# Patient Record
Sex: MALE | Race: WHITE | Employment: OTHER | URBAN - METROPOLITAN AREA
[De-identification: names, ages, dates, MRNs, and addresses within clinical notes are randomized per-mention and may not be internally consistent; named-entity substitution may affect disease eponyms.]

---

## 2017-02-12 ENCOUNTER — HOSPITAL ENCOUNTER (OUTPATIENT)
Age: 57
Discharge: HOME | End: 2017-02-12
Payer: COMMERCIAL

## 2017-02-12 VITALS
DIASTOLIC BLOOD PRESSURE: 87 MMHG | RESPIRATION RATE: 18 BRPM | TEMPERATURE: 98 F | HEART RATE: 81 BPM | SYSTOLIC BLOOD PRESSURE: 145 MMHG

## 2017-02-12 DIAGNOSIS — K21.9 GASTROESOPHAGEAL REFLUX DISEASE, ESOPHAGITIS PRESENCE NOT SPECIFIED: Primary | ICD-10-CM

## 2017-02-12 PROCEDURE — 99213 OFFICE O/P EST LOW 20 MIN: CPT

## 2017-02-12 PROCEDURE — 99214 OFFICE O/P EST MOD 30 MIN: CPT

## 2017-02-12 RX ORDER — PANTOPRAZOLE SODIUM 20 MG/1
20 TABLET, DELAYED RELEASE ORAL DAILY
Qty: 30 TABLET | Refills: 0 | Status: SHIPPED | OUTPATIENT
Start: 2017-02-12 | End: 2017-03-14

## 2017-02-12 NOTE — ED INITIAL ASSESSMENT (HPI)
C/O burning sensation mid chest area after eating for about a week and also burning sensation to rectal area after each bowel movement. Taking Tums with some relief. Hx of indigestion in the past. Denies chest pain.

## 2017-02-12 NOTE — ED PROVIDER NOTES
Patient Seen in: [facility] In [address] [place] [address] [address]    History   No chief complaint on file.     Stated Complaint: INDIGESTION X 1WK    HPI    CHIEF COMPLAINT: Indigestion    HISTORY OF PRESENT ILLNESS: Patient is a 70-year-old male [facility] in today's nurse's notes are reviewed and agree. The patient's family history is reviewed and is noncontributory to the presenting problem, except as indicated as above.     Past Medical History   Diagnosis Date   • Unspecified essential hypertension changes, patient handling secretions well.   Uvula midline  Respiratory: there are no retractions, lungs are clear to auscultation  Cardiovascular: regular rate and rhythm  Gastrointestinal:  abdomen is soft and non tender, no masses, bowel sounds normal. N Refills: 0

## 2018-01-13 ENCOUNTER — APPOINTMENT (OUTPATIENT)
Dept: GENERAL RADIOLOGY | Age: 58
End: 2018-01-13
Attending: FAMILY MEDICINE
Payer: COMMERCIAL

## 2018-01-13 ENCOUNTER — HOSPITAL ENCOUNTER (OUTPATIENT)
Age: 58
Discharge: HOME | End: 2018-01-13
Attending: FAMILY MEDICINE
Payer: COMMERCIAL

## 2018-01-13 VITALS
RESPIRATION RATE: 18 BRPM | TEMPERATURE: 98 F | WEIGHT: 315 LBS | SYSTOLIC BLOOD PRESSURE: 140 MMHG | HEART RATE: 86 BPM | DIASTOLIC BLOOD PRESSURE: 90 MMHG | BODY MASS INDEX: 43 KG/M2 | OXYGEN SATURATION: 96 %

## 2018-01-13 DIAGNOSIS — S20.212A CONTUSION OF RIB ON LEFT SIDE, INITIAL ENCOUNTER: Primary | ICD-10-CM

## 2018-01-13 DIAGNOSIS — S93.401A MILD SPRAIN OF RIGHT ANKLE, INITIAL ENCOUNTER: ICD-10-CM

## 2018-01-13 DIAGNOSIS — S70.02XA CONTUSION OF LEFT HIP, INITIAL ENCOUNTER: ICD-10-CM

## 2018-01-13 PROCEDURE — 73610 X-RAY EXAM OF ANKLE: CPT | Performed by: FAMILY MEDICINE

## 2018-01-13 PROCEDURE — 99214 OFFICE O/P EST MOD 30 MIN: CPT

## 2018-01-13 PROCEDURE — 73502 X-RAY EXAM HIP UNI 2-3 VIEWS: CPT | Performed by: FAMILY MEDICINE

## 2018-01-13 PROCEDURE — 71101 X-RAY EXAM UNILAT RIBS/CHEST: CPT | Performed by: FAMILY MEDICINE

## 2018-01-13 RX ORDER — NICOTINE POLACRILEX 4 MG/1
GUM, CHEWING ORAL
COMMUNITY
End: 2019-10-23

## 2018-01-13 NOTE — ED INITIAL ASSESSMENT (HPI)
Pt. States he tripped/slipped in his garage. Rt. Ankle/foot got caught under him. Left hip & Lt. Ribs (fell more to the left). Denies head injury.

## 2018-01-15 NOTE — ED PROVIDER NOTES
Patient Seen in: [facility]    History   Patient presents with: Ankle Injury: Rt. Hip Pain: Lt.   Trauma (cardiovascular, musculoskeletal): Lt.    Stated Complaint: rib pain / left hip / right ankle pain    HPI  61 yo M here wi conjunctiva   HEAD: Normocephalic, atraumatic  EENT: OP - wnl, moist, Nares normal  NECK: FROM, supple  CHEST: Symmetrical, diffuse tenderness noted over the lateral and posterior rib cage but no bruises noted at this time.    LUNGS: CTAB, no RRW  CV: RRR Ribs (fell more to the left). Denies head               injury.                         Order Specific Question: What is the Relevant Clinical Indication / Reason for Exam?          Answer: rib pain / left hip / right ankle pain      Apply ace wrap Once Approved by: Hiral Edwards MD            Xr Hip W Or Wo Pelvis 2 Or 3 Views, Left (cpt=73502)    Result Date: 1/13/2018  PROCEDURE:  XR HIP W OR WO PELVIS 2 OR 3 VIEWS, LEFT (CPT=73502)  TECHNIQUE:  Unilateral 2 to 3 views of the hip and pelvis if per encounter    Disposition:  Discharge  1/13/2018  4:00 pm    Follow-up:        PMD in the next 3-5 days for reassessment of symptoms         Medications Prescribed:  Discharge Medication List as of 1/13/2018  4:33 PM

## 2018-01-31 ENCOUNTER — HOSPITAL ENCOUNTER (OUTPATIENT)
Age: 58
Discharge: HOME | End: 2018-01-31
Attending: PHYSICIAN ASSISTANT
Payer: COMMERCIAL

## 2018-01-31 ENCOUNTER — APPOINTMENT (OUTPATIENT)
Dept: GENERAL RADIOLOGY | Age: 58
End: 2018-01-31
Attending: PHYSICIAN ASSISTANT
Payer: COMMERCIAL

## 2018-01-31 VITALS
SYSTOLIC BLOOD PRESSURE: 138 MMHG | HEART RATE: 99 BPM | DIASTOLIC BLOOD PRESSURE: 87 MMHG | RESPIRATION RATE: 18 BRPM | TEMPERATURE: 98 F | OXYGEN SATURATION: 98 %

## 2018-01-31 DIAGNOSIS — S22.49XD CLOSED FRACTURE OF MULTIPLE RIBS WITH ROUTINE HEALING, UNSPECIFIED LATERALITY, SUBSEQUENT ENCOUNTER: Primary | ICD-10-CM

## 2018-01-31 DIAGNOSIS — J98.01 COUGH DUE TO BRONCHOSPASM: ICD-10-CM

## 2018-01-31 PROCEDURE — 99214 OFFICE O/P EST MOD 30 MIN: CPT

## 2018-01-31 PROCEDURE — 71111 X-RAY EXAM RIBS/CHEST4/> VWS: CPT | Performed by: PHYSICIAN ASSISTANT

## 2018-01-31 PROCEDURE — 99213 OFFICE O/P EST LOW 20 MIN: CPT

## 2018-01-31 RX ORDER — PROMETHAZINE HYDROCHLORIDE AND CODEINE PHOSPHATE 6.25; 1 MG/5ML; MG/5ML
5 SYRUP ORAL EVERY 4 HOURS PRN
Qty: 180 ML | Refills: 0 | Status: SHIPPED | OUTPATIENT
Start: 2018-01-31 | End: 2018-03-02

## 2018-03-07 ENCOUNTER — OCC HEALTH (OUTPATIENT)
Dept: OCCUPATIONAL MEDICINE | Age: 58
End: 2018-03-07
Attending: PHYSICIAN ASSISTANT

## 2019-07-26 ENCOUNTER — HOSPITAL ENCOUNTER (OUTPATIENT)
Age: 59
Discharge: HOME | End: 2019-07-26
Attending: EMERGENCY MEDICINE
Payer: COMMERCIAL

## 2019-07-26 VITALS
BODY MASS INDEX: 39.17 KG/M2 | HEIGHT: 75 IN | SYSTOLIC BLOOD PRESSURE: 157 MMHG | OXYGEN SATURATION: 95 % | HEART RATE: 84 BPM | TEMPERATURE: 99 F | RESPIRATION RATE: 18 BRPM | DIASTOLIC BLOOD PRESSURE: 94 MMHG | WEIGHT: 315 LBS

## 2019-07-26 DIAGNOSIS — M54.31 SCIATICA OF RIGHT SIDE: Primary | ICD-10-CM

## 2019-07-26 PROCEDURE — 99213 OFFICE O/P EST LOW 20 MIN: CPT

## 2019-07-26 PROCEDURE — 99214 OFFICE O/P EST MOD 30 MIN: CPT

## 2019-07-26 RX ORDER — METHYLPREDNISOLONE 4 MG/1
TABLET ORAL
Qty: 1 PACKAGE | Refills: 0 | Status: SHIPPED | OUTPATIENT
Start: 2019-07-26 | End: 2019-10-23 | Stop reason: ALTCHOICE

## 2019-07-26 RX ORDER — CYCLOBENZAPRINE HCL 10 MG
10 TABLET ORAL 3 TIMES DAILY PRN
Qty: 20 TABLET | Refills: 0 | Status: SHIPPED | OUTPATIENT
Start: 2019-07-26 | End: 2019-08-02

## 2019-07-26 RX ORDER — HYDROCODONE BITARTRATE AND ACETAMINOPHEN 5; 325 MG/1; MG/1
1-2 TABLET ORAL EVERY 4 HOURS PRN
Qty: 20 TABLET | Refills: 0 | Status: SHIPPED | OUTPATIENT
Start: 2019-07-26 | End: 2019-08-02

## 2019-07-26 NOTE — ED INITIAL ASSESSMENT (HPI)
Patient presents with right lower back pain x 6 days which has worsened in the past few days radiates to numbness to right leg. Worse picking up grandson. No incontinence or bowel or bladder.

## 2019-07-26 NOTE — ED PROVIDER NOTES
Patient Seen in: [facility]    History   Patient presents with:  Back Pain (musculoskeletal)  Numbness    Stated Complaint: r lower back pain, r leg numb    HPI    60-year-old male presents emergency department complaining of ri lymphadenopathy no meningismus no carotid bruit  CV: Regular rate and rhythm no murmur rub  Respiratory: Clear to auscultation bilaterally no crackles no wheezes no accessory muscle use  Abdomen: Soft nontender nondistended, no rebound no guarding  no hepa

## 2019-08-08 ENCOUNTER — HOSPITAL ENCOUNTER (OUTPATIENT)
Age: 59
Discharge: HOME | End: 2019-08-08
Attending: FAMILY MEDICINE
Payer: COMMERCIAL

## 2019-08-08 VITALS
OXYGEN SATURATION: 97 % | DIASTOLIC BLOOD PRESSURE: 93 MMHG | SYSTOLIC BLOOD PRESSURE: 145 MMHG | TEMPERATURE: 99 F | RESPIRATION RATE: 18 BRPM | HEART RATE: 89 BPM

## 2019-08-08 DIAGNOSIS — K64.1 GRADE II HEMORRHOIDS: Primary | ICD-10-CM

## 2019-08-08 PROCEDURE — 99212 OFFICE O/P EST SF 10 MIN: CPT

## 2019-08-08 PROCEDURE — 99213 OFFICE O/P EST LOW 20 MIN: CPT

## 2019-08-08 RX ORDER — DOCUSATE SODIUM 100 MG/1
100 CAPSULE, LIQUID FILLED ORAL 2 TIMES DAILY
Qty: 20 CAPSULE | Refills: 0 | Status: SHIPPED | OUTPATIENT
Start: 2019-08-08 | End: 2019-08-18

## 2019-08-08 NOTE — ED PROVIDER NOTES
Patient Seen in: [facility]    History   Patient presents with:  Anal Problem (GI)    Stated Complaint: rash s/p hospital stay    HPI  63-year-old gentleman presents to Medicare with a 4-day history of rectal pain, patient was r exudate. Eyes: Pupils are equal, round, and reactive to light. Conjunctivae and EOM are normal. Right eye exhibits no discharge. Left eye exhibits no discharge. Neck: Normal range of motion. Neck supple. No thyromegaly present.    Cardiovascular: Normal

## 2019-08-08 NOTE — ED INITIAL ASSESSMENT (HPI)
Patient presents with cc of rectal pain for 4 or more days since being in the hospital for intractable back pain. No blood noted. Most painful after a BM.

## 2019-10-23 ENCOUNTER — OFFICE VISIT (OUTPATIENT)
Dept: FAMILY MEDICINE CLINIC | Facility: CLINIC | Age: 59
End: 2019-10-23
Payer: COMMERCIAL

## 2019-10-23 VITALS
RESPIRATION RATE: 16 BRPM | WEIGHT: 315 LBS | HEIGHT: 75 IN | DIASTOLIC BLOOD PRESSURE: 74 MMHG | TEMPERATURE: 99 F | BODY MASS INDEX: 39.17 KG/M2 | HEART RATE: 96 BPM | SYSTOLIC BLOOD PRESSURE: 132 MMHG | OXYGEN SATURATION: 97 %

## 2019-10-23 DIAGNOSIS — R05.9 COUGH: Primary | ICD-10-CM

## 2019-10-23 PROCEDURE — 99202 OFFICE O/P NEW SF 15 MIN: CPT | Performed by: NURSE PRACTITIONER

## 2019-10-23 RX ORDER — CELECOXIB 200 MG/1
CAPSULE ORAL
Refills: 0 | COMMUNITY
Start: 2019-10-09 | End: 2021-03-22 | Stop reason: ALTCHOICE

## 2019-10-23 RX ORDER — CYCLOBENZAPRINE HCL 10 MG
TABLET ORAL
Refills: 0 | COMMUNITY
Start: 2019-10-08 | End: 2021-03-22 | Stop reason: ALTCHOICE

## 2019-10-23 RX ORDER — BENZONATATE 200 MG/1
200 CAPSULE ORAL 3 TIMES DAILY PRN
Qty: 20 CAPSULE | Refills: 0 | Status: SHIPPED | OUTPATIENT
Start: 2019-10-23 | End: 2019-10-30

## 2019-10-23 RX ORDER — HYDROCODONE BITARTRATE AND ACETAMINOPHEN 5; 325 MG/1; MG/1
TABLET ORAL
Refills: 0 | COMMUNITY
Start: 2019-08-03 | End: 2021-03-22 | Stop reason: ALTCHOICE

## 2019-10-23 RX ORDER — OMEPRAZOLE 20 MG/1
CAPSULE, DELAYED RELEASE ORAL
Refills: 9 | COMMUNITY
Start: 2019-10-01

## 2019-10-23 RX ORDER — GABAPENTIN 600 MG/1
TABLET ORAL
Refills: 2 | COMMUNITY
Start: 2019-10-09 | End: 2021-03-22 | Stop reason: ALTCHOICE

## 2019-10-23 RX ORDER — LISINOPRIL 20 MG/1
TABLET ORAL
Refills: 3 | COMMUNITY
Start: 2019-09-24 | End: 2021-03-22 | Stop reason: ALTCHOICE

## 2019-10-23 NOTE — PATIENT INSTRUCTIONS
Start mucinex  Drink plenty of fluids. Cool mist humidifier. Benzonatate for cough as instructed. Viral Upper Respiratory Illness (Adult)    You have a viral upper respiratory illness (URI), which is another term for the common cold.  This illness your healthcare provider or pharmacist which over-the-counter medicines are safe to use. (Note: Don't use decongestants if you have high blood pressure.)  Follow-up care  Follow up with your healthcare provider, or as advised.   When to seek medical advice

## 2019-10-23 NOTE — PROGRESS NOTES
CHIEF COMPLAINT:   Patient presents with:  Cough: bad cough, xstarted last night        HPI:   Fina Shanae is a 61year old male who presents for cough since last night. Cough is occas prod; worse last night and interfered with sleep.   Associated sympt HENT: Atraumatic, normocephalic. TM's clear bilaterally. Nostrils patent, nasal mucosa mildly inflamed, clear nasal mucus. No erythema of the throat. NECK: supple, non-tender. LUNGS: Normal respiratory rate. Normal effort.   Dry cough once during exam. You have a viral upper respiratory illness (URI), which is another term for the common cold. This illness is contagious during the first few days. It is spread through the air by coughing and sneezing.  It may also be spread by direct contact (touching the · Over-the-counter cold medicines will not shorten the length of time you’re sick, but they may be helpful for the following symptoms: cough, sore throat, and nasal and sinus congestion.  If you take prescription medicines, ask your healthcare provider or p

## 2021-03-22 ENCOUNTER — HOSPITAL ENCOUNTER (OUTPATIENT)
Age: 61
Discharge: HOME | End: 2021-03-22
Payer: COMMERCIAL

## 2021-03-22 VITALS
HEIGHT: 75 IN | DIASTOLIC BLOOD PRESSURE: 88 MMHG | HEART RATE: 66 BPM | OXYGEN SATURATION: 98 % | SYSTOLIC BLOOD PRESSURE: 143 MMHG | RESPIRATION RATE: 16 BRPM | BODY MASS INDEX: 38.3 KG/M2 | TEMPERATURE: 98 F | WEIGHT: 308 LBS

## 2021-03-22 DIAGNOSIS — J01.00 ACUTE NON-RECURRENT MAXILLARY SINUSITIS: Primary | ICD-10-CM

## 2021-03-22 DIAGNOSIS — J98.01 BRONCHOSPASM: ICD-10-CM

## 2021-03-22 LAB — SARS-COV-2 RNA RESP QL NAA+PROBE: NOT DETECTED

## 2021-03-22 PROCEDURE — 99213 OFFICE O/P EST LOW 20 MIN: CPT | Performed by: PHYSICIAN ASSISTANT

## 2021-03-22 RX ORDER — FLUTICASONE PROPIONATE 50 MCG
2 SPRAY, SUSPENSION (ML) NASAL DAILY
Qty: 16 G | Refills: 0 | Status: SHIPPED | OUTPATIENT
Start: 2021-03-22 | End: 2021-04-21

## 2021-03-22 RX ORDER — AZITHROMYCIN 250 MG/1
TABLET, FILM COATED ORAL
Qty: 1 PACKAGE | Refills: 0 | Status: SHIPPED | OUTPATIENT
Start: 2021-03-22 | End: 2021-03-27

## 2021-03-22 NOTE — ED INITIAL ASSESSMENT (HPI)
Pt presents today with c/o nasal congestion x 1 week and a productive cough with white sputum that started last night. Pt denies any fevers.

## 2021-03-22 NOTE — ED PROVIDER NOTES
Patient Seen in: [facility]      History   Patient presents with:  Cough/URI  Nasal Congestion    Stated Complaint: Congestion and cough 1 wk    HPI/Subjective:   HPI    Pleasant 51-year-old gentleman.   Medical history of reflux and hyper normal conjunctival  ENT: No injection noted to the bilateral auditory canals; no loss of landmarks. Normal nasal mucosa without audible nasal congestion. Oropharynx is patent without evidence of erythema, exudates or deviation.   No stridor to auscultatio

## 2021-07-12 ENCOUNTER — APPOINTMENT (OUTPATIENT)
Dept: GENERAL RADIOLOGY | Age: 61
End: 2021-07-12
Attending: NURSE PRACTITIONER
Payer: COMMERCIAL

## 2021-07-12 ENCOUNTER — HOSPITAL ENCOUNTER (OUTPATIENT)
Age: 61
Discharge: HOME | End: 2021-07-12
Attending: EMERGENCY MEDICINE
Payer: COMMERCIAL

## 2021-07-12 VITALS
WEIGHT: 315 LBS | TEMPERATURE: 98 F | HEIGHT: 75 IN | SYSTOLIC BLOOD PRESSURE: 147 MMHG | OXYGEN SATURATION: 95 % | HEART RATE: 75 BPM | DIASTOLIC BLOOD PRESSURE: 82 MMHG | RESPIRATION RATE: 18 BRPM | BODY MASS INDEX: 39.17 KG/M2

## 2021-07-12 DIAGNOSIS — R10.9 ACUTE RIGHT FLANK PAIN: Primary | ICD-10-CM

## 2021-07-12 DIAGNOSIS — S63.602A SPRAIN OF LEFT THUMB, UNSPECIFIED SITE OF DIGIT, INITIAL ENCOUNTER: ICD-10-CM

## 2021-07-12 LAB
POCT BILIRUBIN URINE: NEGATIVE
POCT BLOOD URINE: NEGATIVE
POCT GLUCOSE URINE: NEGATIVE MG/DL
POCT KETONE URINE: NEGATIVE MG/DL
POCT LEUKOCYTE ESTERASE URINE: NEGATIVE
POCT NITRITE URINE: NEGATIVE
POCT PH URINE: 6 (ref 5–8)
POCT PROTEIN URINE: NEGATIVE MG/DL
POCT SPECIFIC GRAVITY URINE: 1.02
POCT URINE CLARITY: CLEAR
POCT URINE COLOR: YELLOW
POCT UROBILINOGEN URINE: 0.2 MG/DL

## 2021-07-12 PROCEDURE — 73140 X-RAY EXAM OF FINGER(S): CPT | Performed by: NURSE PRACTITIONER

## 2021-07-12 PROCEDURE — 99214 OFFICE O/P EST MOD 30 MIN: CPT

## 2021-07-12 PROCEDURE — 96372 THER/PROPH/DIAG INJ SC/IM: CPT

## 2021-07-12 PROCEDURE — 99213 OFFICE O/P EST LOW 20 MIN: CPT

## 2021-07-12 PROCEDURE — 81002 URINALYSIS NONAUTO W/O SCOPE: CPT | Performed by: NURSE PRACTITIONER

## 2021-07-12 RX ORDER — KETOROLAC TROMETHAMINE 30 MG/ML
30 INJECTION, SOLUTION INTRAMUSCULAR; INTRAVENOUS ONCE
Status: COMPLETED | OUTPATIENT
Start: 2021-07-12 | End: 2021-07-12

## 2021-07-12 NOTE — ED PROVIDER NOTES
Patient Seen in: [facility]      History   Patient presents with:  Back Pain  Finger Injury    Stated Complaint: FLANK PAIN / BACK PAIN    HPI/Subjective:   HPI  Patient is a 10-year-old male with  past medical history of esophageal reflu report initial swelling which has resolved somewhat.         Objective:   Past Medical History:   Diagnosis Date   • Esophageal reflux               Past Surgical History:   Procedure Laterality Date   • OTHER SURGICAL HISTORY      finger x 2   • OTHER SURG Musculoskeletal:      Comments: Left hand Exam:  No swelling, ecchymosis, erythema noted. Radial, median, ulnar nerves grossly intact. Sensation intact    Tendons:  Extensor and deep and superficial flexor tendons intact.    Mild tenderness with palpatio osseous lesions noted. Osteoarthritic changes. There are well-corticated calcific densities adjacent to the distal interphalangeal joint which may represent accessory ossifications or old injuries.   I personally reviewed this x-ray and agree with finding

## 2021-07-12 NOTE — ED INITIAL ASSESSMENT (HPI)
Pt presents to the IC with complaints of pain to right back. Pt states he woke today with a  Dull pain and then sneezed which made the pain significantly worse.  Pt denies any trauma but states he is always lifting, but reports he had been out of town [address]

## 2021-07-12 NOTE — ED PROVIDER NOTES
I reviewed that chart and discussed the case. I have examined the patient and noted patient is awake alert no acute distress. No abdominal tenderness. No midline lumbar tenderness.   There is focal tenderness to the right lower paraspinal/flank musculatu

## 2022-07-21 ENCOUNTER — HOSPITAL ENCOUNTER (OUTPATIENT)
Age: 62
Discharge: HOME | End: 2022-07-21
Payer: COMMERCIAL

## 2022-07-21 VITALS
TEMPERATURE: 98 F | OXYGEN SATURATION: 97 % | SYSTOLIC BLOOD PRESSURE: 135 MMHG | DIASTOLIC BLOOD PRESSURE: 80 MMHG | HEART RATE: 69 BPM | HEIGHT: 75 IN | RESPIRATION RATE: 17 BRPM | BODY MASS INDEX: 29.84 KG/M2 | WEIGHT: 240 LBS

## 2022-07-21 DIAGNOSIS — J06.9 UPPER RESPIRATORY TRACT INFECTION, UNSPECIFIED TYPE: Primary | ICD-10-CM

## 2022-07-21 LAB — SARS-COV-2 RNA RESP QL NAA+PROBE: NOT DETECTED

## 2022-07-21 PROCEDURE — 99213 OFFICE O/P EST LOW 20 MIN: CPT

## 2022-07-21 RX ORDER — BENZONATATE 100 MG/1
100 CAPSULE ORAL 3 TIMES DAILY PRN
Qty: 30 CAPSULE | Refills: 0 | Status: SHIPPED | OUTPATIENT
Start: 2022-07-21 | End: 2022-08-20

## 2022-07-21 RX ORDER — GUAIFENESIN AND CODEINE PHOSPHATE 100; 10 MG/5ML; MG/5ML
5 SOLUTION ORAL EVERY 8 HOURS PRN
Qty: 118 ML | Refills: 0 | Status: SHIPPED | OUTPATIENT
Start: 2022-07-21 | End: 2022-08-04

## 2023-05-08 ENCOUNTER — APPOINTMENT (OUTPATIENT)
Dept: ULTRASOUND IMAGING | Age: 63
End: 2023-05-08
Payer: COMMERCIAL

## 2023-05-08 ENCOUNTER — HOSPITAL ENCOUNTER (EMERGENCY)
Age: 63
Discharge: HOME | End: 2023-05-08
Attending: EMERGENCY MEDICINE
Payer: COMMERCIAL

## 2023-05-08 ENCOUNTER — APPOINTMENT (OUTPATIENT)
Dept: GENERAL RADIOLOGY | Age: 63
End: 2023-05-08
Payer: COMMERCIAL

## 2023-05-08 VITALS
DIASTOLIC BLOOD PRESSURE: 85 MMHG | OXYGEN SATURATION: 97 % | TEMPERATURE: 98 F | RESPIRATION RATE: 16 BRPM | BODY MASS INDEX: 34.19 KG/M2 | SYSTOLIC BLOOD PRESSURE: 161 MMHG | HEIGHT: 75 IN | HEART RATE: 69 BPM | WEIGHT: 275 LBS

## 2023-05-08 DIAGNOSIS — I82.502 CHRONIC DEEP VEIN THROMBOSIS (DVT) OF LEFT LOWER EXTREMITY, UNSPECIFIED VEIN (HCC): Primary | ICD-10-CM

## 2023-05-08 DIAGNOSIS — S83.412A SPRAIN OF MEDIAL COLLATERAL LIGAMENT OF LEFT KNEE, INITIAL ENCOUNTER: ICD-10-CM

## 2023-05-08 PROCEDURE — 99284 EMERGENCY DEPT VISIT MOD MDM: CPT

## 2023-05-08 PROCEDURE — 93971 EXTREMITY STUDY: CPT

## 2023-05-08 PROCEDURE — 73560 X-RAY EXAM OF KNEE 1 OR 2: CPT

## 2023-05-08 RX ORDER — NAPROXEN 500 MG/1
500 TABLET ORAL 2 TIMES DAILY PRN
Qty: 20 TABLET | Refills: 0 | Status: SHIPPED | OUTPATIENT
Start: 2023-05-08 | End: 2023-05-18

## 2023-05-09 NOTE — ED PROVIDER NOTES
The patient was seen and examined. Note reviewed and agreed. I provided a substantive portion of the care of this patient. I personally performed the Medical Decision Making for this encounter. I evaluated the patient. While the patient has evidence of clot on ultrasound, this does not appear to be acute and in fact the description matches prior ultrasound report from 3 years ago and the patient did have a DVT. His pain today began after an acute injury and is most consistent with knee strain. Recommend treatment with NSAIDs. PCP follow-up. Return for new or worsening symptoms.

## 2023-05-09 NOTE — DISCHARGE INSTRUCTIONS
Follow-up with your PCP, if you do have worsening swelling of the leg or pain or shortness of breath be reevaluated,    Take anti-inflammatory medication with food      The best treatment for minor injuries is R.I.C.E. and NSAID medications. R.I.C.E. = Rest  Ice  Compression  Elevation      Rest: Do not use the injured body part unnecessarily. If this is a lower extremity, do not take long walks, run or do anything that causes increased pain. Gradually progress to your normal activity, using pain as your guide. Ice: Apply cold compresses to the injured area. The area that is injured is inflammed. Inflammation causes warmth, so ice may give some relief. You may ice through a brace or ace wrap. Compression: Compression to the area will help control swelling. An ace wrap is the most simple form of compression. You can also wear a brace. Elevation: Raise the injured extremity above heart level. This will reduce throbbing pain and swelling associated with injures. Prop the injured extremity up with pillows while lying down.

## 2023-06-07 ENCOUNTER — APPOINTMENT (OUTPATIENT)
Dept: CT IMAGING | Age: 63
End: 2023-06-07
Attending: EMERGENCY MEDICINE
Payer: COMMERCIAL

## 2023-06-07 ENCOUNTER — HOSPITAL ENCOUNTER (EMERGENCY)
Age: 63
Discharge: HOME | End: 2023-06-07
Attending: EMERGENCY MEDICINE
Payer: COMMERCIAL

## 2023-06-07 VITALS
BODY MASS INDEX: 34.67 KG/M2 | TEMPERATURE: 99 F | WEIGHT: 273 LBS | RESPIRATION RATE: 20 BRPM | DIASTOLIC BLOOD PRESSURE: 84 MMHG | OXYGEN SATURATION: 98 % | HEART RATE: 80 BPM | HEIGHT: 74.5 IN | SYSTOLIC BLOOD PRESSURE: 132 MMHG

## 2023-06-07 DIAGNOSIS — R10.13 ABDOMINAL PAIN, EPIGASTRIC: Primary | ICD-10-CM

## 2023-06-07 LAB
ALBUMIN SERPL-MCNC: 3.6 G/DL (ref 3.4–5)
ALBUMIN/GLOB SERPL: 1.1 {RATIO} (ref 1–2)
ALP LIVER SERPL-CCNC: 103 U/L
ALT SERPL-CCNC: 24 U/L
ANION GAP SERPL CALC-SCNC: 5 MMOL/L (ref 0–18)
AST SERPL-CCNC: 17 U/L (ref 15–37)
BASOPHILS # BLD AUTO: 0.05 X10(3) UL (ref 0–0.2)
BASOPHILS NFR BLD AUTO: 0.6 %
BILIRUB SERPL-MCNC: 0.3 MG/DL (ref 0.1–2)
BILIRUB UR QL STRIP.AUTO: NEGATIVE
BUN BLD-MCNC: 21 MG/DL (ref 7–18)
CALCIUM BLD-MCNC: 8.7 MG/DL (ref 8.5–10.1)
CHLORIDE SERPL-SCNC: 110 MMOL/L (ref 98–112)
CLARITY UR REFRACT.AUTO: CLEAR
CO2 SERPL-SCNC: 24 MMOL/L (ref 21–32)
COLOR UR AUTO: YELLOW
CREAT BLD-MCNC: 1.02 MG/DL
EOSINOPHIL # BLD AUTO: 0.13 X10(3) UL (ref 0–0.7)
EOSINOPHIL NFR BLD AUTO: 1.6 %
ERYTHROCYTE [DISTWIDTH] IN BLOOD BY AUTOMATED COUNT: 13 %
GFR SERPLBLD BASED ON 1.73 SQ M-ARVRAT: 83 ML/MIN/1.73M2 (ref 60–?)
GLOBULIN PLAS-MCNC: 3.3 G/DL (ref 2.8–4.4)
GLUCOSE BLD-MCNC: 107 MG/DL (ref 70–99)
GLUCOSE UR STRIP.AUTO-MCNC: NEGATIVE MG/DL
HCT VFR BLD AUTO: 41.7 %
HGB BLD-MCNC: 14.4 G/DL
IMM GRANULOCYTES # BLD AUTO: 0.02 X10(3) UL (ref 0–1)
IMM GRANULOCYTES NFR BLD: 0.2 %
KETONES UR STRIP.AUTO-MCNC: NEGATIVE MG/DL
LEUKOCYTE ESTERASE UR QL STRIP.AUTO: NEGATIVE
LIPASE SERPL-CCNC: 85 U/L (ref 13–75)
LYMPHOCYTES # BLD AUTO: 2.67 X10(3) UL (ref 1–4)
LYMPHOCYTES NFR BLD AUTO: 32 %
MCH RBC QN AUTO: 30.8 PG (ref 26–34)
MCHC RBC AUTO-ENTMCNC: 34.5 G/DL (ref 31–37)
MCV RBC AUTO: 89.1 FL
MONOCYTES # BLD AUTO: 0.66 X10(3) UL (ref 0.1–1)
MONOCYTES NFR BLD AUTO: 7.9 %
NEUTROPHILS # BLD AUTO: 4.82 X10 (3) UL (ref 1.5–7.7)
NEUTROPHILS # BLD AUTO: 4.82 X10(3) UL (ref 1.5–7.7)
NEUTROPHILS NFR BLD AUTO: 57.7 %
NITRITE UR QL STRIP.AUTO: NEGATIVE
OSMOLALITY SERPL CALC.SUM OF ELEC: 291 MOSM/KG (ref 275–295)
PH UR STRIP.AUTO: 5 [PH] (ref 5–8)
PLATELET # BLD AUTO: 205 10(3)UL (ref 150–450)
POTASSIUM SERPL-SCNC: 3.6 MMOL/L (ref 3.5–5.1)
PROT SERPL-MCNC: 6.9 G/DL (ref 6.4–8.2)
PROT UR STRIP.AUTO-MCNC: NEGATIVE MG/DL
RBC # BLD AUTO: 4.68 X10(6)UL
RBC UR QL AUTO: NEGATIVE
SODIUM SERPL-SCNC: 139 MMOL/L (ref 136–145)
SP GR UR STRIP.AUTO: 1.02 (ref 1–1.03)
UROBILINOGEN UR STRIP.AUTO-MCNC: 0.2 MG/DL
WBC # BLD AUTO: 8.4 X10(3) UL (ref 4–11)

## 2023-06-07 PROCEDURE — 80053 COMPREHEN METABOLIC PANEL: CPT | Performed by: EMERGENCY MEDICINE

## 2023-06-07 PROCEDURE — 99284 EMERGENCY DEPT VISIT MOD MDM: CPT

## 2023-06-07 PROCEDURE — 96361 HYDRATE IV INFUSION ADD-ON: CPT

## 2023-06-07 PROCEDURE — 83690 ASSAY OF LIPASE: CPT | Performed by: EMERGENCY MEDICINE

## 2023-06-07 PROCEDURE — 85025 COMPLETE CBC W/AUTO DIFF WBC: CPT | Performed by: EMERGENCY MEDICINE

## 2023-06-07 PROCEDURE — 74177 CT ABD & PELVIS W/CONTRAST: CPT | Performed by: EMERGENCY MEDICINE

## 2023-06-07 PROCEDURE — 96374 THER/PROPH/DIAG INJ IV PUSH: CPT

## 2023-06-07 PROCEDURE — 81003 URINALYSIS AUTO W/O SCOPE: CPT | Performed by: EMERGENCY MEDICINE

## 2023-06-07 RX ORDER — KETOROLAC TROMETHAMINE 15 MG/ML
15 INJECTION, SOLUTION INTRAMUSCULAR; INTRAVENOUS ONCE
Status: COMPLETED | OUTPATIENT
Start: 2023-06-07 | End: 2023-06-07

## 2023-06-08 NOTE — ED INITIAL ASSESSMENT (HPI)
Patient with mid abdominal pain since 1100, denies any N/V/D. No pain medication taken PTA. Patient states bilateral flank pain since 1600. Denies any hx of kidney stones.

## 2024-01-31 ENCOUNTER — V-VISIT (OUTPATIENT)
Dept: URGENT CARE | Age: 64
End: 2024-01-31

## 2024-01-31 ENCOUNTER — APPOINTMENT (OUTPATIENT)
Dept: URGENT CARE | Age: 64
End: 2024-01-31

## 2024-01-31 VITALS
TEMPERATURE: 98.4 F | DIASTOLIC BLOOD PRESSURE: 85 MMHG | OXYGEN SATURATION: 97 % | RESPIRATION RATE: 18 BRPM | HEIGHT: 75 IN | WEIGHT: 280 LBS | HEART RATE: 98 BPM | SYSTOLIC BLOOD PRESSURE: 145 MMHG | BODY MASS INDEX: 34.82 KG/M2

## 2024-01-31 DIAGNOSIS — J02.9 SORE THROAT: Primary | ICD-10-CM

## 2024-01-31 DIAGNOSIS — H61.21 IMPACTED CERUMEN OF RIGHT EAR: ICD-10-CM

## 2024-01-31 DIAGNOSIS — R52 BODY ACHES: ICD-10-CM

## 2024-01-31 DIAGNOSIS — J06.9 VIRAL UPPER RESPIRATORY TRACT INFECTION: ICD-10-CM

## 2024-01-31 LAB
FLUAV AG UPPER RESP QL IA.RAPID: NEGATIVE
FLUBV AG UPPER RESP QL IA.RAPID: NEGATIVE
INTERNAL PROCEDURAL CONTROLS ACCEPTABLE: YES
S PYO AG THROAT QL IA.RAPID: NEGATIVE
SARS-COV+SARS-COV-2 AG RESP QL IA.RAPID: NOT DETECTED
TEST LOT EXPIRATION DATE: NORMAL
TEST LOT EXPIRATION DATE: NORMAL
TEST LOT NUMBER: NORMAL
TEST LOT NUMBER: NORMAL

## 2024-01-31 ASSESSMENT — ENCOUNTER SYMPTOMS
NEUROLOGICAL NEGATIVE: 1
HEMATOLOGIC/LYMPHATIC NEGATIVE: 1
EYES NEGATIVE: 1
SORE THROAT: 1
CHILLS: 1
COUGH: 1
GASTROINTESTINAL NEGATIVE: 1
ENDOCRINE NEGATIVE: 1
PSYCHIATRIC NEGATIVE: 1
ALLERGIC/IMMUNOLOGIC NEGATIVE: 1

## 2024-01-31 ASSESSMENT — PAIN SCALES - GENERAL: PAINLEVEL: 4

## 2024-02-10 ENCOUNTER — V-VISIT (OUTPATIENT)
Dept: URGENT CARE | Age: 64
End: 2024-02-10

## 2024-02-10 ENCOUNTER — APPOINTMENT (OUTPATIENT)
Dept: URGENT CARE | Age: 64
End: 2024-02-10

## 2024-02-10 VITALS
HEART RATE: 82 BPM | BODY MASS INDEX: 34.82 KG/M2 | DIASTOLIC BLOOD PRESSURE: 84 MMHG | HEIGHT: 75 IN | RESPIRATION RATE: 20 BRPM | SYSTOLIC BLOOD PRESSURE: 142 MMHG | OXYGEN SATURATION: 97 % | TEMPERATURE: 99 F | WEIGHT: 280 LBS

## 2024-02-10 DIAGNOSIS — R05.3 COUGH, PERSISTENT: Primary | ICD-10-CM

## 2024-02-10 DIAGNOSIS — J22 LRTI (LOWER RESPIRATORY TRACT INFECTION): ICD-10-CM

## 2024-02-10 RX ORDER — PROMETHAZINE HYDROCHLORIDE 6.25 MG/5ML
5-10 SYRUP ORAL NIGHTLY PRN
Qty: 50 ML | Refills: 0 | Status: SHIPPED | OUTPATIENT
Start: 2024-02-10 | End: 2024-02-15

## 2024-02-10 RX ORDER — AMOXICILLIN AND CLAVULANATE POTASSIUM 875; 125 MG/1; MG/1
1 TABLET, FILM COATED ORAL 2 TIMES DAILY
Qty: 14 TABLET | Refills: 0 | Status: SHIPPED | OUTPATIENT
Start: 2024-02-10 | End: 2024-02-17

## 2024-02-10 ASSESSMENT — ENCOUNTER SYMPTOMS
COUGH: 1
VOMITING: 0
SHORTNESS OF BREATH: 0
FEVER: 0
WHEEZING: 0
SORE THROAT: 1
RHINORRHEA: 1
DIARRHEA: 0
ABDOMINAL PAIN: 0

## 2024-06-13 ENCOUNTER — HOSPITAL ENCOUNTER (OUTPATIENT)
Age: 64
Discharge: HOME | End: 2024-06-13
Payer: COMMERCIAL

## 2024-06-13 VITALS
BODY MASS INDEX: 35.56 KG/M2 | HEIGHT: 75 IN | SYSTOLIC BLOOD PRESSURE: 157 MMHG | TEMPERATURE: 98 F | OXYGEN SATURATION: 96 % | RESPIRATION RATE: 14 BRPM | HEART RATE: 80 BPM | DIASTOLIC BLOOD PRESSURE: 97 MMHG | WEIGHT: 286 LBS

## 2024-06-13 DIAGNOSIS — S01.01XA LACERATION OF SCALP, INITIAL ENCOUNTER: Primary | ICD-10-CM

## 2024-06-13 PROCEDURE — 12001 RPR S/N/AX/GEN/TRNK 2.5CM/<: CPT

## 2024-06-13 PROCEDURE — 99213 OFFICE O/P EST LOW 20 MIN: CPT

## 2024-06-13 NOTE — ED PROVIDER NOTES
Patient Seen in: [facility]      History     Chief Complaint   Patient presents with    Laceration/Abrasion     Stated Complaint: laceration on head    Subjective:   HPI   Herbert Hernandez is a 63 year old male here for evaluation of blunt head injury with associated laceration to scalp that occurred  shortly prior to arrival.  Laceration is approximately 2 cm in length.  Bleeding well controlled.  Tetanus is  UTD.   No reported pain, loss of consciousness, nausea, vomiting, syncope, near syncope, vertigo, dizziness, neck pain/ stiffness, slurred speech, facial droop, lower/ upper extremity numbness, tingling, weakness, parasthesias.    No medications taken prior to arrival.               Objective:   Past Medical History:    Esophageal reflux              Past Surgical History:   Procedure Laterality Date    Cataract Bilateral     june and may of 2022.    Other surgical history      finger x 2    Other surgical history  2007-left  2015-right    rotator cuff repair     Tonsillectomy      Vasectomy                  Social History     Socioeconomic History    Marital status:    Tobacco Use    Smoking status: Never     Passive exposure: Never    Smokeless tobacco: Never   Vaping Use    Vaping status: Never Used   Substance and Sexual Activity    Alcohol use: No     Comment: rarely    Drug use: No     Social Determinants of Health      Received from [facility]              Review of Systems   All other systems reviewed and are negative.      Positive for stated complaint: laceration on head  Other systems are as noted in HPI.  Constitutional and vital signs reviewed.      All other systems reviewed and negative except as noted above.    Physical Exam     ED Triage Vitals [06/13/24 1349]   BP (!) 157/97   Pulse 80   Resp 14   Temp 98.3 °F (36.8 °C)   Temp src Oral   SpO2 96 %   O2 Device None (Room air)       Current Vitals:   Vital Signs  BP: (!) 157/97  Pulse: 80  Resp: 14  Temp:  98.3 °F (36.8 °C)  Temp src: Oral    Oxygen Therapy  SpO2: 96 %  O2 Device: None (Room air)            Physical Exam  Vitals and nursing note reviewed.   Constitutional:       General: He is not in acute distress.     Appearance: Normal appearance. He is normal weight. He is not ill-appearing, toxic-appearing or diaphoretic.   HENT:      Head: Normocephalic.      Comments: 2 cm clean horizontal laceration w/ sharp distinct edges localized to scalp       Nose: Nose normal.   Eyes:      Extraocular Movements: Extraocular movements intact.      Pupils: Pupils are equal, round, and reactive to light.   Cardiovascular:      Rate and Rhythm: Normal rate.      Pulses: Normal pulses.   Pulmonary:      Effort: Pulmonary effort is normal.      Breath sounds: Normal breath sounds.   Musculoskeletal:         General: No swelling, tenderness, deformity or signs of injury. Normal range of motion.      Cervical back: Normal range of motion and neck supple.      Right lower leg: No edema.      Left lower leg: No edema.   Skin:     General: Skin is warm and dry.      Capillary Refill: Capillary refill takes less than 2 seconds.      Coloration: Skin is not jaundiced or pale.      Findings: No bruising, erythema, lesion or rash.   Neurological:      General: No focal deficit present.      Mental Status: He is alert and oriented to person, place, and time. Mental status is at baseline.   Psychiatric:         Mood and Affect: Mood normal.         Behavior: Behavior normal.         Thought Content: Thought content normal.         Judgment: Judgment normal.               ED Course   Labs Reviewed - No data to display  Procedure: laceration repair  Location: scalp  Size/ depth: 2 cm/ 1mm  Irrigated with normal saline  Anesthetized with 5mL of 1% lidocaine with epinephrine  Skin: closed with staples.   Total: 7  Patient tolerated procedure well   Wound left open                    MDM                                        Medical Decision  Making  63 year old male presents with 2 cm clean horizontal laceration w/ sharp distinct edges localized to scalp without LOC   Plan  - CT brain without IV contrast considered however Risks versus benefits of CT scan discussed with patient and/or parent(s).  They understand risks and  DO NOT wish to get a CT scan.   - Wound care -> irrigation with normal saline -> sterile dressing with non adherent and bacitracin   - Primary closure  - reassess  -  PATIENT REPORTS IMPROVEMENT IN SYMPTOMS WITH INITIAL INTERVENTIONS AT DC  - discharge to home   - Return to ED in 48 hours for wound check, if needed otherwise  - return to ED in 7 days for suture/ staple removal OR  - Return to this ED if symptoms of infection occur: Erythema, swelling, evidence of lymphatic streaking, purulent discharge, increased pain, decreased range of motion should occur  - OTC: ibuprofen 600mg po q8 hours/ prn. Tylenol 1g po q 6 hours/ prn.    - Refer to pcp  for evaluation of migraines        Disposition and Plan     Clinical Impression:  1. Laceration of scalp, initial encounter         Disposition:  Discharge  6/13/2024  3:06 pm    Follow-up:  [facility]  [address]  [facility] [address]  [phone]  In 1 week  For suture removal          Medications Prescribed:  Discharge Medication List as of 6/13/2024  3:09 PM

## 2024-06-13 NOTE — ED INITIAL ASSESSMENT (HPI)
1030 this am pt hit hit right side of his head on a corner of a window box. Last tetanus 8/2018. Denies loc.

## 2024-06-20 ENCOUNTER — HOSPITAL ENCOUNTER (OUTPATIENT)
Age: 64
Discharge: HOME | End: 2024-06-20

## 2024-06-20 VITALS
SYSTOLIC BLOOD PRESSURE: 149 MMHG | WEIGHT: 282 LBS | TEMPERATURE: 97 F | DIASTOLIC BLOOD PRESSURE: 90 MMHG | HEART RATE: 72 BPM | OXYGEN SATURATION: 97 % | HEIGHT: 75 IN | RESPIRATION RATE: 18 BRPM | BODY MASS INDEX: 35.06 KG/M2

## 2024-06-20 DIAGNOSIS — Z48.02 ENCOUNTER FOR STAPLE REMOVAL: Primary | ICD-10-CM

## 2024-06-20 NOTE — ED INITIAL ASSESSMENT (HPI)
Patient here for removal of staples from the head. No redness, drainage, or signs of infection and patient denies any problems.

## 2024-06-20 NOTE — ED PROVIDER NOTES
Patient Seen in: [facility]      History     Chief Complaint   Patient presents with    Staple Removal     Stated Complaint: staple removal    Subjective:   64 yo male presents to the immediate care with c/o staple removal.  Patient states he had staples placed to his head about 1 week ago.  He denies any redness, swelling, pain, or drainage.     The history is provided by the patient.         Objective:   Past Medical History:    Esophageal reflux              Past Surgical History:   Procedure Laterality Date    Cataract Bilateral     june and may of 2022.    Other surgical history      finger x 2    Other surgical history  2007-left  2015-right    rotator cuff repair     Tonsillectomy      Vasectomy                  Social History     Socioeconomic History    Marital status:    Tobacco Use    Smoking status: Never     Passive exposure: Never    Smokeless tobacco: Never   Vaping Use    Vaping status: Never Used   Substance and Sexual Activity    Alcohol use: No     Comment: rarely    Drug use: No     Social Determinants of Health      Received from [facility]              Review of Systems   Constitutional: Negative.    Skin:  Positive for wound.   All other systems reviewed and are negative.      Positive for stated complaint: staple removal  Other systems are as noted in HPI.  Constitutional and vital signs reviewed.      All other systems reviewed and negative except as noted above.    Physical Exam     ED Triage Vitals [06/20/24 1034]   /90   Pulse 72   Resp 18   Temp 97 °F (36.1 °C)   Temp src Temporal   SpO2 97 %   O2 Device None (Room air)       Current Vitals:   Vital Signs  BP: 149/90  Pulse: 72  Resp: 18  Temp: 97 °F (36.1 °C)  Temp src: Temporal    Oxygen Therapy  SpO2: 97 %  O2 Device: None (Room air)            Physical Exam  Vitals and nursing note reviewed.   Constitutional:       General: He is not in acute distress.     Appearance: Normal appearance.  He is normal weight. He is not ill-appearing.   HENT:      Head: Normocephalic.      Comments: 7 staples to right frontal scalp.  They are clean, dry and intact.  No evidence of infection.    Eyes:      Conjunctiva/sclera: Conjunctivae normal.   Cardiovascular:      Rate and Rhythm: Normal rate and regular rhythm.      Pulses: Normal pulses.      Heart sounds: Normal heart sounds.   Pulmonary:      Effort: Pulmonary effort is normal. No respiratory distress.      Breath sounds: Normal breath sounds.   Musculoskeletal:         General: Normal range of motion.   Skin:     General: Skin is warm and dry.   Neurological:      General: No focal deficit present.      Mental Status: He is alert and oriented to person, place, and time.   Psychiatric:         Mood and Affect: Mood normal.         Behavior: Behavior normal.           ED Course   Labs Reviewed - No data to display              MDM                 Medical Decision Making  64 yo male for staple removal.  Staples are ready for removal.     Staples removed without difficulty.  No erythema, discharge, tenderness or drainage noted.    Discussed ongoing wound care with patient.  F/u with PCP or return as needed.         Disposition and Plan     Clinical Impression:  1. Encounter for staple removal         Disposition:  Discharge  6/20/2024 11:10 am    Follow-up:  Melania [facility]  [address] [facility] [address] [phone]      As needed          Medications Prescribed:  Discharge Medication List as of 6/20/2024 11:13 AM

## 2024-06-20 NOTE — DISCHARGE INSTRUCTIONS
Continue to keep wound clean and dry.   Use sunscreen to help with the appearance of the wound.   Once completely healed you can use Vitamin E lotion or Mederma to help with the appearance.   Follow up with your PCP as needed.

## 2024-09-04 ENCOUNTER — APPOINTMENT (OUTPATIENT)
Dept: CT IMAGING | Age: 64
End: 2024-09-04
Attending: EMERGENCY MEDICINE
Payer: COMMERCIAL

## 2024-09-04 ENCOUNTER — HOSPITAL ENCOUNTER (EMERGENCY)
Age: 64
Discharge: HOME | End: 2024-09-04
Attending: EMERGENCY MEDICINE
Payer: COMMERCIAL

## 2024-09-04 VITALS
WEIGHT: 282 LBS | HEIGHT: 75 IN | OXYGEN SATURATION: 95 % | SYSTOLIC BLOOD PRESSURE: 141 MMHG | HEART RATE: 88 BPM | RESPIRATION RATE: 18 BRPM | TEMPERATURE: 98 F | BODY MASS INDEX: 35.06 KG/M2 | DIASTOLIC BLOOD PRESSURE: 91 MMHG

## 2024-09-04 DIAGNOSIS — R10.9 FLANK PAIN: Primary | ICD-10-CM

## 2024-09-04 LAB
ALBUMIN SERPL-MCNC: 3.7 G/DL (ref 3.4–5)
ALBUMIN/GLOB SERPL: 1 {RATIO} (ref 1–2)
ALP LIVER SERPL-CCNC: 114 U/L
ALT SERPL-CCNC: 25 U/L
ANION GAP SERPL CALC-SCNC: 5 MMOL/L (ref 0–18)
AST SERPL-CCNC: 22 U/L (ref 15–37)
BASOPHILS # BLD AUTO: 0.06 X10(3) UL (ref 0–0.2)
BASOPHILS NFR BLD AUTO: 0.6 %
BILIRUB SERPL-MCNC: 0.3 MG/DL (ref 0.1–2)
BILIRUB UR QL STRIP.AUTO: NEGATIVE
BUN BLD-MCNC: 14 MG/DL (ref 9–23)
CALCIUM BLD-MCNC: 9.2 MG/DL (ref 8.5–10.1)
CHLORIDE SERPL-SCNC: 109 MMOL/L (ref 98–112)
CLARITY UR REFRACT.AUTO: CLEAR
CO2 SERPL-SCNC: 24 MMOL/L (ref 21–32)
COLOR UR AUTO: YELLOW
CREAT BLD-MCNC: 1.02 MG/DL
EGFRCR SERPLBLD CKD-EPI 2021: 82 ML/MIN/1.73M2 (ref 60–?)
EOSINOPHIL # BLD AUTO: 0.21 X10(3) UL (ref 0–0.7)
EOSINOPHIL NFR BLD AUTO: 2.2 %
ERYTHROCYTE [DISTWIDTH] IN BLOOD BY AUTOMATED COUNT: 13.2 %
GLOBULIN PLAS-MCNC: 3.7 G/DL (ref 2.8–4.4)
GLUCOSE BLD-MCNC: 99 MG/DL (ref 70–99)
GLUCOSE UR STRIP.AUTO-MCNC: NEGATIVE MG/DL
HCT VFR BLD AUTO: 42.6 %
HGB BLD-MCNC: 15.1 G/DL
IMM GRANULOCYTES # BLD AUTO: 0.03 X10(3) UL (ref 0–1)
IMM GRANULOCYTES NFR BLD: 0.3 %
KETONES UR STRIP.AUTO-MCNC: NEGATIVE MG/DL
LEUKOCYTE ESTERASE UR QL STRIP.AUTO: NEGATIVE
LIPASE SERPL-CCNC: 69 U/L (ref 12–53)
LYMPHOCYTES # BLD AUTO: 2.75 X10(3) UL (ref 1–4)
LYMPHOCYTES NFR BLD AUTO: 28.8 %
MCH RBC QN AUTO: 30.8 PG (ref 26–34)
MCHC RBC AUTO-ENTMCNC: 35.4 G/DL (ref 31–37)
MCV RBC AUTO: 86.8 FL
MONOCYTES # BLD AUTO: 0.76 X10(3) UL (ref 0.1–1)
MONOCYTES NFR BLD AUTO: 8 %
NEUTROPHILS # BLD AUTO: 5.73 X10 (3) UL (ref 1.5–7.7)
NEUTROPHILS # BLD AUTO: 5.73 X10(3) UL (ref 1.5–7.7)
NEUTROPHILS NFR BLD AUTO: 60.1 %
NITRITE UR QL STRIP.AUTO: NEGATIVE
OSMOLALITY SERPL CALC.SUM OF ELEC: 287 MOSM/KG (ref 275–295)
PH UR STRIP.AUTO: 5 [PH] (ref 5–8)
PLATELET # BLD AUTO: 242 10(3)UL (ref 150–450)
POTASSIUM SERPL-SCNC: 3.5 MMOL/L (ref 3.5–5.1)
PROT SERPL-MCNC: 7.4 G/DL (ref 6.4–8.2)
PROT UR STRIP.AUTO-MCNC: NEGATIVE MG/DL
RBC # BLD AUTO: 4.91 X10(6)UL
RBC UR QL AUTO: NEGATIVE
SODIUM SERPL-SCNC: 138 MMOL/L (ref 136–145)
SP GR UR STRIP.AUTO: 1.02 (ref 1–1.03)
UROBILINOGEN UR STRIP.AUTO-MCNC: 0.2 MG/DL
WBC # BLD AUTO: 9.5 X10(3) UL (ref 4–11)

## 2024-09-04 PROCEDURE — 36415 COLL VENOUS BLD VENIPUNCTURE: CPT

## 2024-09-04 PROCEDURE — 80053 COMPREHEN METABOLIC PANEL: CPT | Performed by: EMERGENCY MEDICINE

## 2024-09-04 PROCEDURE — 99284 EMERGENCY DEPT VISIT MOD MDM: CPT

## 2024-09-04 PROCEDURE — 85025 COMPLETE CBC W/AUTO DIFF WBC: CPT | Performed by: EMERGENCY MEDICINE

## 2024-09-04 PROCEDURE — 74176 CT ABD & PELVIS W/O CONTRAST: CPT | Performed by: EMERGENCY MEDICINE

## 2024-09-04 PROCEDURE — 83690 ASSAY OF LIPASE: CPT | Performed by: EMERGENCY MEDICINE

## 2024-09-04 PROCEDURE — 81003 URINALYSIS AUTO W/O SCOPE: CPT | Performed by: EMERGENCY MEDICINE

## 2024-09-04 NOTE — ED INITIAL ASSESSMENT (HPI)
Pt c/o pressure in groin up to naval. Sharp flank pain R>l, also pain with BM.  Started around 6pm last night.

## 2024-09-04 NOTE — ED PROVIDER NOTES
Patient Seen in: [facility] In [place]      History     Chief Complaint   Patient presents with    Abdomen/Flank Pain     Stated Complaint: lower abdominal, groin pressure, bilateral flank pain > right started last nigh*    Subjective:   HPI    Patient states that he developed symptoms of pressure in his suprapubic and perineal region that has been persistent over the past day, starting last night.  Concurrent with this, the patient has developed right flank pain.  He also has an urge to have a bowel movement, but states that the stool volumes are low and he states that the consistency is less formed.  He has no generalized abdominal pain.  Has had no fever or chills.  He has no abdominal distention.  He has no nausea or vomiting or diarrhea.  He has no previous history of similar symptoms.  He has no dysuria, hematuria or urinary frequency.    Objective:   Past Medical History:    Esophageal reflux              Past Surgical History:   Procedure Laterality Date    Cataract Bilateral     june and may of 2022.    Other surgical history      finger x 2    Other surgical history  2007-left  2015-right    rotator cuff repair     Tonsillectomy      Vasectomy                  Social History     Socioeconomic History    Marital status:    Tobacco Use    Smoking status: Never     Passive exposure: Never    Smokeless tobacco: Never   Vaping Use    Vaping status: Never Used   Substance and Sexual Activity    Alcohol use: No     Comment: rarely    Drug use: No     Social Determinants of Health      Received from [facility]              Review of Systems    Positive for stated Chief Complaint: Abdomen/Flank Pain    Other systems are as noted in HPI.  Constitutional and vital signs reviewed.      All other systems reviewed and negative except as noted above.    Physical Exam     ED Triage Vitals [09/04/24 1732]   BP (!) 141/91   Pulse 88   Resp 18   Temp 98.2 °F (36.8 °C)   Temp src  Oral   SpO2 95 %   O2 Device None (Room air)       Current Vitals:   Vital Signs  BP: (!) 141/91  Pulse: 88  Resp: 18  Temp: 98.2 °F (36.8 °C)  Temp src: Oral    Oxygen Therapy  SpO2: 95 %  O2 Device: None (Room air)            Physical Exam  Vitals and nursing note reviewed.   Constitutional:       Appearance: He is well-developed.   HENT:      Head: Normocephalic.   Cardiovascular:      Rate and Rhythm: Normal rate and regular rhythm.      Heart sounds: Normal heart sounds. No murmur heard.  Pulmonary:      Effort: Pulmonary effort is normal.      Breath sounds: Normal breath sounds.   Abdominal:      General: Bowel sounds are normal.      Palpations: Abdomen is soft.      Tenderness: There is no abdominal tenderness. There is no guarding.      Comments: Patient indicates discomfort to his right flank, but no palpable tenderness is elicited.   Musculoskeletal:         General: No tenderness. Normal range of motion.      Cervical back: Normal range of motion and neck supple.   Lymphadenopathy:      Cervical: No cervical adenopathy.   Skin:     General: Skin is warm and dry.      Findings: No rash.   Neurological:      Mental Status: He is alert and oriented to person, place, and time.      Sensory: No sensory deficit.              ED Course     Labs Reviewed   LIPASE - Abnormal; Notable for the following components:       Result Value    Lipase 69 (*)     All other components within normal limits   COMP METABOLIC PANEL (14) - Normal   CBC WITH DIFFERENTIAL WITH PLATELET   URINALYSIS, ROUTINE   RAINBOW DRAW LAVENDER   RAINBOW DRAW LIGHT GREEN          ED Course as of 09/04/24 1506  ------------------------------------------------------------  Time: 09/04 1803  Value: CBC With Differential With Platelet  Comment: Normal  ------------------------------------------------------------  Time: 09/04 1833  Value: Lipase(!): 69  Comment: (Reviewed)  ------------------------------------------------------------  Time: 09/04  1833  Value: Comp Metabolic Panel (14)  Comment: Normal  ------------------------------------------------------------  Time: 09/04 1833  Value: CT ABDOMEN+PELVIS KIDNEYSTONE 2D RNDR(NO IV,NO ORAL)(CPT=74176)  Comment: No acute intra-abdominal process.  Small left inguinal hernia noted.   Urinalysis was also unremarkable.  Patient's inguinal region was reexamined after CT findings were noted.  There is no evidence of palpable hernia and certainly no evidence of tenderness or incarceration.         MDM      Patient comes to the Emergency Department with right flank pain and suprapubic pressure sensation.  Differential diagnosis included kidney stones, bowel obstruction, acute intra-abdominal inflammatory process.  However, patient's workup was negative, including blood work, urinalysis and CT scan.  Patient was discharged home with instructed follow-up closely with primary care physician and to return immediately for any acute change or worsening symptoms.                                   Medical Decision Making      Disposition and Plan     Clinical Impression:  1. Flank pain         Disposition:  Discharge  9/4/2024  6:40 pm    Follow-up:  Melania David  [address] [facility] [address] [phone]    Call in 1 day(s)            Medications Prescribed:  Discharge Medication List as of 9/4/2024  6:41 PM

## 2024-09-05 NOTE — ED QUICK NOTES
PT returned to the ED to provide a urine sample, per Ed physican's request. Per Dr. Hernández, Pt can leave after providing sample. Dr. Hernández will call with results.

## 2025-01-10 ENCOUNTER — HOSPITAL ENCOUNTER (EMERGENCY)
Age: 65
Discharge: HOME | End: 2025-01-10
Attending: EMERGENCY MEDICINE
Payer: COMMERCIAL

## 2025-01-10 ENCOUNTER — APPOINTMENT (OUTPATIENT)
Dept: CT IMAGING | Age: 65
End: 2025-01-10
Attending: EMERGENCY MEDICINE
Payer: COMMERCIAL

## 2025-01-10 VITALS
WEIGHT: 281.94 LBS | DIASTOLIC BLOOD PRESSURE: 88 MMHG | BODY MASS INDEX: 35 KG/M2 | RESPIRATION RATE: 16 BRPM | OXYGEN SATURATION: 98 % | SYSTOLIC BLOOD PRESSURE: 148 MMHG | TEMPERATURE: 99 F | HEART RATE: 79 BPM

## 2025-01-10 DIAGNOSIS — S09.90XA INJURY OF HEAD, INITIAL ENCOUNTER: ICD-10-CM
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
## 2025-01-10 PROCEDURE — 99284 EMERGENCY DEPT VISIT MOD MDM: CPT

## 2025-01-10 PROCEDURE — 70450 CT HEAD/BRAIN W/O DYE: CPT | Performed by: EMERGENCY MEDICINE

## 2025-01-10 NOTE — ED INITIAL ASSESSMENT (HPI)
Slipped on the ice this am and fell outside his home. States he hit the back of his head. C/o frontal headache. Denies loss of consciousness

## 2025-01-11 NOTE — ED PROVIDER NOTES
Patient Seen in: [facility] In [place]      History     Chief Complaint   Patient presents with    Fall    Head Neck Injury     Stated Complaint: s/p fall on ice  head injury . -loc - blood thinners    Subjective:   HPI    This is a 64-year-old gentleman here for evaluation of head injury.  Slipped on the ice earlier today fell backwards struck his head on the asphalt.  No LOC no vomiting reports mild headache.  Denies any other complaints or concerns at this time.    Objective:     Past Medical History:    Esophageal reflux              Past Surgical History:   Procedure Laterality Date    Cataract Bilateral     june and may of 2022.    Other surgical history      finger x 2    Other surgical history  2007-left  2015-right    rotator cuff repair     Tonsillectomy      Vasectomy                  Social History     Socioeconomic History    Marital status:    Tobacco Use    Smoking status: Never     Passive exposure: Never    Smokeless tobacco: Never   Vaping Use    Vaping status: Never Used   Substance and Sexual Activity    Alcohol use: No     Comment: rarely    Drug use: No     Social Drivers of Health      Received from [facility]                  Physical Exam     ED Triage Vitals [01/10/25 1038]   /87   Pulse 80   Resp 16   Temp 98.5 °F (36.9 °C)   Temp src Temporal   SpO2 97 %   O2 Device None (Room air)       Current Vitals:   Vital Signs  BP: 148/88  Pulse: 79  Resp: 16  Temp: 98.5 °F (36.9 °C)  Temp src: Temporal    Oxygen Therapy  SpO2: 98 %  O2 Device: None (Room air)        Physical Exam      Physical Exam  Vitals signs and nursing note reviewed.   General:  Patient laying supine in the bed in no acute distress  Neck: No midline tenderness no pain with range of motion  Head: Normocephalic and atraumatic.   HEENT:  Mucous membranes are moist.   Cardiovascular:  Normal rate and regular rhythm.  No Edema  Pulmonary:  Pulmonary effort is normal.  Normal  breath sounds. No wheezing, rhonchi or rales.   Abdominal: Soft nontender nondistended, normal bowel sounds, no guarding no rebound tenderness  Skin: Warm and dry  Neurological: Awake alert, speech is normal        ED Course   Labs Reviewed - No data to display         CT BRAIN OR HEAD (CPT=70450)    Result Date: 1/10/2025  PROCEDURE:  CT BRAIN OR HEAD (38840)  COMPARISON:  None.  INDICATIONS:  s/p fall on ice  head injury . -loc - blood thinners  TECHNIQUE:  Noncontrast CT scanning is performed through the brain. Dose reduction techniques were used. Dose information is transmitted to the ACR (American College of Radiology) NRDR (National Radiology Data Registry) which includes the Dose Index Registry.  PATIENT STATED HISTORY: (As transcribed by Technologist)  Patient slipped on the ice and hit the back of his head. Patient complains of frontal headache.    FINDINGS:  VENTRICLES/SULCI:  --- Headache, pain involving the head, after head injury. INTRACRANIAL:  There are no abnormal extraaxial fluid collections.  There is no midline shift.  There are no intraparenchymal brain abnormalities.  There is nothing specific for acute infarct.  There is no hemorrhage or mass lesion.  SINUSES:           No sign of acute sinusitis.  MASTOIDS:          No sign of acute inflammation. Postinflammatory changes are present within the paranasal sinuses, without findings indicating acute sinusitis. SKULL:             No evidence for fracture or osseous abnormality. OTHER:             None.            CONCLUSION:  No acute process.    LOCATION:  [facility]   Dictated by (CST): Americo Alva MD on 1/10/2025 at 11:40 AM     Finalized by (CST): Americo Alva MD on 1/10/2025 at 11:42 AM             MDM      This is a 64-year-old gentleman here for evaluation of fall with head injury.  Differential includes concussion, intracranial hemorrhage skull fracture.  CT of the head noncontrast was obtained which shows no acute intracranial  hemorrhage patient is otherwise without injury.  Will be discharged home with instructions for brain rest return precautions discussed patient and wife at bedside are in agreement with plan.     I independently viewed and interpreted the following imaging: CT head with no acute intracranial hemorrhage      Medical Decision Making      Disposition and Plan     Clinical Impression:  1. Fall, initial encounter    2. Injury of head, initial encounter         Disposition:  Discharge  1/10/2025 12:12 pm    Follow-up:  Demi Chiang DO  [address] [place] [id number]  [phone]    Follow up  Follow-up with your primary doctor or at the clinic listed for further evaluation.  Return to the emergency department immediately if your symptoms do not continue to improve or if you have any new concerning symptoms.          Medications Prescribed:  Discharge Medication List as of 1/10/2025 12:14 PM              Supplementary Documentation:

## (undated) NOTE — ED AVS SNAPSHOT
[facility] in [address] [place] [address] Drive,[address]    [address] [place] [address]    Phone:  [phone]    Fax:  [address]   MRN: [id number]    Department:  [facility]   Date of Visit:  2/12/2017 may not be covered by your plan. Please contact your insurance company to determine coverage for follow-up care and referrals. [facility]  [address] [facility], [address] [place] [address]  [phone] [address]  [address] prescription right away and begin taking the medication(s) as directed. If the Immediate Care Provider has read X-rays, these will be re-interpreted by a radiologist.  If there is a significant change in your reading, you will be contacted.  Please make Medicaid plans. To get signed up and covered, please call [phone] and ask to get set up for an insurance coverage that is in-network with [facility]. [place]     Sign up for [employer], your secure online medical record.   [employer] wi